# Patient Record
(demographics unavailable — no encounter records)

---

## 2025-05-30 NOTE — HISTORY OF PRESENT ILLNESS
[FreeTextEntry1] : This 28-year-old patient presents to office for evaluation of facial features that still appear to her as masculine and exacerbate her gender dysphoria. She has a history of prior FFS in 2020. This patient started her transition in 2014 and has been on hormonal therapy consistently since 2014. She has been in therapy and was diagnosed with Gender Dysphoria concerned about certain facial features that appear masculine and cause bullying desires facial feminization surgery followed by Transgender team. Admits to botox, last done in 11/2023. Admits to filler, to cheeks lips and jaw, last done in 11/2023. Denies any silicone injections. The following facial features appear masculine and will need to be modified due to continued dysphoria  -chin -cheeks and temples -nose  Surgery #1: 2020 / FFS / Dr. Berg / no complications (hairline, brow, cheeks, fat graft, chin, nose)   Surgery #2: 2024 / Dr. Mahajan / Liposuction / no complications   Surgery #3: 2017 / Dr. Yao / Breast augmentation / no complications

## 2025-05-30 NOTE — ASSESSMENT
[FreeTextEntry1] : PE: Const: Awake, alert, oriented Eyes: EOMI. sclera without erythema or injection Resp: Even , unlabored. no increased WOB. No cough appreciated Breasts: Breast exam was performed with a medical chaperone present (DM). Width is 15 cm. Bilateral + capsular contracture. Neck: no JVD Neuro: Nl Tone Psych: Nl Affect Skin: no rashes/lesions on visible skin HEENT: Improved, but continued hypoplastic cheeks requiring second stage long masculine full chin, worse with animation   plan for medically necessary facial feminization to alleviate symptoms of gender dysphoria plan: 3dct scan for virtual surgical planning fat grafting to zygoma, staged procedure - cpt 42533 genioplasty, reductive revision cpt 69033  Refer to ENT for rhinoseptoplasty.  plan for medically necessary breast augmentation with implant exchange [id number] and [id number]   [employer] Implants RSD-475 x 2 RSD-525 x 2  We will need two letters of support in accordance with [place] Medicaid guidelines. Patient will work on obtaining these from PCP/mental health provider/endocrinologist.  I, Dr. Berg, personally performed the evaluation and management (E/M) services for this new patient. That E/M includes conducting the clinically appropriate initial history &/or exam, assessing all conditions, and establishing the plan of care. Today, my MORE, Sonny Gan PA-C, was here to observe my evaluation and management service for this patient & follow plan of care established by me going forward.

## 2025-05-30 NOTE — ASSESSMENT
[FreeTextEntry1] : PE: Const: Awake, alert, oriented Eyes: EOMI. sclera without erythema or injection Resp: Even , unlabored. no increased WOB. No cough appreciated Breasts: Breast exam was performed with a medical chaperone present (DM). Width is 15 cm. Bilateral + capsular contracture. Neck: no JVD Neuro: Nl Tone Psych: Nl Affect Skin: no rashes/lesions on visible skin HEENT: Improved, but continued hypoplastic cheeks requiring second stage long masculine full chin, worse with animation   plan for medically necessary facial feminization to alleviate symptoms of gender dysphoria plan: 3dct scan for virtual surgical planning fat grafting to zygoma, staged procedure - cpt 49666 genioplasty, reductive revision cpt 01250  Refer to ENT for rhinoseptoplasty.  plan for medically necessary breast augmentation with implant exchange [id number] and [id number]   [employer] Implants RSD-475 x 2 RSD-525 x 2  We will need two letters of support in accordance with [place] Medicaid guidelines. Patient will work on obtaining these from PCP/mental health provider/endocrinologist.  I, Dr. Berg, personally performed the evaluation and management (E/M) services for this new patient. That E/M includes conducting the clinically appropriate initial history &/or exam, assessing all conditions, and establishing the plan of care. Today, my MORE, Sonny Gan PA-C, was here to observe my evaluation and management service for this patient & follow plan of care established by me going forward.